# Patient Record
(demographics unavailable — no encounter records)

---

## 2025-06-26 NOTE — ASSESSMENT
[FreeTextEntry1] : Assessment: First MPJ bursitis, left foot.  Plan:   DP, medial oblique, and lateral weightbearing radiographs were taken of the left foot and show significant soft tissue swelling in the left first MPJ with mild osteoarthritic changes.  No fractures or dislocations are noted.  At this time, we held off on anti-inflammatory medicine.  He will continue activities as tolerated and return if the pain increases or his activity level is hindered. We discussed treatment options including NSAID therapy and or cortisone injections if the area continues to swell and or pain becomes more frequent or severe.

## 2025-06-26 NOTE — PHYSICAL EXAM
[TextEntry] : Dorsalis pedis and posterior tibial pulses were palpable and equal bilat.  The capillary return was instant bilat. ROM is  complete and pain free.    The left first MPJ is swollen and prominent.  Ecchymosis is absent.